# Patient Record
Sex: FEMALE | Race: OTHER
[De-identification: names, ages, dates, MRNs, and addresses within clinical notes are randomized per-mention and may not be internally consistent; named-entity substitution may affect disease eponyms.]

---

## 2020-03-10 ENCOUNTER — HOSPITAL ENCOUNTER (INPATIENT)
Dept: HOSPITAL 72 - SDSOVERFLO | Age: 65
LOS: 4 days | Discharge: HOME | DRG: 328 | End: 2020-03-14
Payer: COMMERCIAL

## 2020-03-10 VITALS — DIASTOLIC BLOOD PRESSURE: 83 MMHG | SYSTOLIC BLOOD PRESSURE: 123 MMHG

## 2020-03-10 VITALS — DIASTOLIC BLOOD PRESSURE: 70 MMHG | SYSTOLIC BLOOD PRESSURE: 111 MMHG

## 2020-03-10 VITALS — SYSTOLIC BLOOD PRESSURE: 127 MMHG | DIASTOLIC BLOOD PRESSURE: 78 MMHG

## 2020-03-10 VITALS — WEIGHT: 183 LBS | BODY MASS INDEX: 31.24 KG/M2 | HEIGHT: 64 IN

## 2020-03-10 VITALS — SYSTOLIC BLOOD PRESSURE: 133 MMHG | DIASTOLIC BLOOD PRESSURE: 87 MMHG

## 2020-03-10 VITALS — DIASTOLIC BLOOD PRESSURE: 69 MMHG | SYSTOLIC BLOOD PRESSURE: 114 MMHG

## 2020-03-10 VITALS — DIASTOLIC BLOOD PRESSURE: 59 MMHG | SYSTOLIC BLOOD PRESSURE: 96 MMHG

## 2020-03-10 VITALS — SYSTOLIC BLOOD PRESSURE: 139 MMHG | DIASTOLIC BLOOD PRESSURE: 90 MMHG

## 2020-03-10 VITALS — DIASTOLIC BLOOD PRESSURE: 59 MMHG | SYSTOLIC BLOOD PRESSURE: 107 MMHG

## 2020-03-10 VITALS — SYSTOLIC BLOOD PRESSURE: 103 MMHG | DIASTOLIC BLOOD PRESSURE: 66 MMHG

## 2020-03-10 VITALS — SYSTOLIC BLOOD PRESSURE: 138 MMHG | DIASTOLIC BLOOD PRESSURE: 93 MMHG

## 2020-03-10 VITALS — DIASTOLIC BLOOD PRESSURE: 55 MMHG | SYSTOLIC BLOOD PRESSURE: 90 MMHG

## 2020-03-10 VITALS — SYSTOLIC BLOOD PRESSURE: 124 MMHG | DIASTOLIC BLOOD PRESSURE: 73 MMHG

## 2020-03-10 VITALS — DIASTOLIC BLOOD PRESSURE: 72 MMHG | SYSTOLIC BLOOD PRESSURE: 120 MMHG

## 2020-03-10 VITALS — DIASTOLIC BLOOD PRESSURE: 85 MMHG | SYSTOLIC BLOOD PRESSURE: 122 MMHG

## 2020-03-10 VITALS — SYSTOLIC BLOOD PRESSURE: 149 MMHG | DIASTOLIC BLOOD PRESSURE: 88 MMHG

## 2020-03-10 VITALS — SYSTOLIC BLOOD PRESSURE: 126 MMHG | DIASTOLIC BLOOD PRESSURE: 83 MMHG

## 2020-03-10 VITALS — SYSTOLIC BLOOD PRESSURE: 109 MMHG | DIASTOLIC BLOOD PRESSURE: 62 MMHG

## 2020-03-10 VITALS — SYSTOLIC BLOOD PRESSURE: 91 MMHG | DIASTOLIC BLOOD PRESSURE: 58 MMHG

## 2020-03-10 VITALS — DIASTOLIC BLOOD PRESSURE: 78 MMHG | SYSTOLIC BLOOD PRESSURE: 121 MMHG

## 2020-03-10 DIAGNOSIS — K42.9: ICD-10-CM

## 2020-03-10 DIAGNOSIS — D64.9: ICD-10-CM

## 2020-03-10 DIAGNOSIS — K21.9: ICD-10-CM

## 2020-03-10 DIAGNOSIS — D72.829: ICD-10-CM

## 2020-03-10 DIAGNOSIS — K44.9: Primary | ICD-10-CM

## 2020-03-10 LAB
ADD MANUAL DIFF: NO
ADD MANUAL DIFF: NO
ANION GAP SERPL CALC-SCNC: 10 MMOL/L (ref 5–15)
ANION GAP SERPL CALC-SCNC: 12 MMOL/L (ref 5–15)
APTT BLD: 28 SEC (ref 23–33)
BASOPHILS NFR BLD AUTO: 0.4 % (ref 0–2)
BASOPHILS NFR BLD AUTO: 1.2 % (ref 0–2)
BUN SERPL-MCNC: 12 MG/DL (ref 7–18)
BUN SERPL-MCNC: 13 MG/DL (ref 7–18)
CALCIUM SERPL-MCNC: 8.8 MG/DL (ref 8.5–10.1)
CALCIUM SERPL-MCNC: 9.1 MG/DL (ref 8.5–10.1)
CHLORIDE SERPL-SCNC: 106 MMOL/L (ref 98–107)
CHLORIDE SERPL-SCNC: 107 MMOL/L (ref 98–107)
CO2 SERPL-SCNC: 24 MMOL/L (ref 21–32)
CO2 SERPL-SCNC: 24 MMOL/L (ref 21–32)
CREAT SERPL-MCNC: 0.7 MG/DL (ref 0.55–1.3)
CREAT SERPL-MCNC: 0.7 MG/DL (ref 0.55–1.3)
EOSINOPHIL NFR BLD AUTO: 0.2 % (ref 0–3)
EOSINOPHIL NFR BLD AUTO: 4.9 % (ref 0–3)
ERYTHROCYTE [DISTWIDTH] IN BLOOD BY AUTOMATED COUNT: 12.8 % (ref 11.6–14.8)
ERYTHROCYTE [DISTWIDTH] IN BLOOD BY AUTOMATED COUNT: 14.4 % (ref 11.6–14.8)
HCT VFR BLD CALC: 40.6 % (ref 37–47)
HCT VFR BLD CALC: 42.9 % (ref 37–47)
HGB BLD-MCNC: 13.1 G/DL (ref 12–16)
HGB BLD-MCNC: 14 G/DL (ref 12–16)
INR PPP: 0.9 (ref 0.9–1.1)
LYMPHOCYTES NFR BLD AUTO: 27.2 % (ref 20–45)
LYMPHOCYTES NFR BLD AUTO: 7.8 % (ref 20–45)
MCV RBC AUTO: 85 FL (ref 80–99)
MCV RBC AUTO: 87 FL (ref 80–99)
MONOCYTES NFR BLD AUTO: 3 % (ref 1–10)
MONOCYTES NFR BLD AUTO: 7.2 % (ref 1–10)
NEUTROPHILS NFR BLD AUTO: 59.5 % (ref 45–75)
NEUTROPHILS NFR BLD AUTO: 88.6 % (ref 45–75)
PHOSPHATE SERPL-MCNC: 4.7 MG/DL (ref 2.5–4.9)
PLATELET # BLD: 274 K/UL (ref 150–450)
PLATELET # BLD: 289 K/UL (ref 150–450)
POTASSIUM SERPL-SCNC: 3.8 MMOL/L (ref 3.5–5.1)
POTASSIUM SERPL-SCNC: 4 MMOL/L (ref 3.5–5.1)
RBC # BLD AUTO: 4.67 M/UL (ref 4.2–5.4)
RBC # BLD AUTO: 5.05 M/UL (ref 4.2–5.4)
SODIUM SERPL-SCNC: 141 MMOL/L (ref 136–145)
SODIUM SERPL-SCNC: 142 MMOL/L (ref 136–145)
WBC # BLD AUTO: 19.4 K/UL (ref 4.8–10.8)
WBC # BLD AUTO: 6.5 K/UL (ref 4.8–10.8)

## 2020-03-10 PROCEDURE — 71045 X-RAY EXAM CHEST 1 VIEW: CPT

## 2020-03-10 PROCEDURE — 85730 THROMBOPLASTIN TIME PARTIAL: CPT

## 2020-03-10 PROCEDURE — 93005 ELECTROCARDIOGRAM TRACING: CPT

## 2020-03-10 PROCEDURE — 0B978ZZ DRAINAGE OF LEFT MAIN BRONCHUS, VIA NATURAL OR ARTIFICIAL OPENING ENDOSCOPIC: ICD-10-PCS

## 2020-03-10 PROCEDURE — 86901 BLOOD TYPING SEROLOGIC RH(D): CPT

## 2020-03-10 PROCEDURE — 94003 VENT MGMT INPAT SUBQ DAY: CPT

## 2020-03-10 PROCEDURE — 0BUT4JZ SUPPLEMENT DIAPHRAGM WITH SYNTHETIC SUBSTITUTE, PERCUTANEOUS ENDOSCOPIC APPROACH: ICD-10-PCS

## 2020-03-10 PROCEDURE — 74220 X-RAY XM ESOPHAGUS 1CNTRST: CPT

## 2020-03-10 PROCEDURE — 86920 COMPATIBILITY TEST SPIN: CPT

## 2020-03-10 PROCEDURE — 80048 BASIC METABOLIC PNL TOTAL CA: CPT

## 2020-03-10 PROCEDURE — 85025 COMPLETE CBC W/AUTO DIFF WBC: CPT

## 2020-03-10 PROCEDURE — 82330 ASSAY OF CALCIUM: CPT

## 2020-03-10 PROCEDURE — 87081 CULTURE SCREEN ONLY: CPT

## 2020-03-10 PROCEDURE — 85610 PROTHROMBIN TIME: CPT

## 2020-03-10 PROCEDURE — 0WQF4ZZ REPAIR ABDOMINAL WALL, PERCUTANEOUS ENDOSCOPIC APPROACH: ICD-10-PCS

## 2020-03-10 PROCEDURE — 85007 BL SMEAR W/DIFF WBC COUNT: CPT

## 2020-03-10 PROCEDURE — 83735 ASSAY OF MAGNESIUM: CPT

## 2020-03-10 PROCEDURE — 86900 BLOOD TYPING SEROLOGIC ABO: CPT

## 2020-03-10 PROCEDURE — 94150 VITAL CAPACITY TEST: CPT

## 2020-03-10 PROCEDURE — 36415 COLL VENOUS BLD VENIPUNCTURE: CPT

## 2020-03-10 PROCEDURE — 86850 RBC ANTIBODY SCREEN: CPT

## 2020-03-10 PROCEDURE — 0DV44ZZ RESTRICTION OF ESOPHAGOGASTRIC JUNCTION, PERCUTANEOUS ENDOSCOPIC APPROACH: ICD-10-PCS

## 2020-03-10 PROCEDURE — 0DS64ZZ REPOSITION STOMACH, PERCUTANEOUS ENDOSCOPIC APPROACH: ICD-10-PCS

## 2020-03-10 PROCEDURE — 84100 ASSAY OF PHOSPHORUS: CPT

## 2020-03-10 RX ADMIN — SODIUM CHLORIDE SCH MLS/HR: 0.9 INJECTION INTRAVENOUS at 22:02

## 2020-03-10 RX ADMIN — DEXTROSE, SODIUM CHLORIDE, AND POTASSIUM CHLORIDE SCH MLS/HR: 5; .45; .15 INJECTION INTRAVENOUS at 20:31

## 2020-03-10 NOTE — IMMEDIATE POST-OP EVALUATION
Immediate Post-Op Evalulation


Immediate Post-Op Evalulation


Procedure:  Laparoscopic paraesophageal hernia repair, nissin fundoplication


Date of Evaluation:  Mar 10, 2020


Time of Evaluation:  16:36


IV Fluids:  1.4L


Blood Products:  0


Estimated Blood Loss:  50


Urinary Output:  300


Blood Pressure Systolic:  96


Blood Pressure Diastolic:  59


Pulse Rate:  92


Respiratory Rate:  18


O2 Sat by Pulse Oximetry:  100


Temperature (Fahrenheit):  97


Pain Score (1-10):  0


Nausea:  No


Vomiting:  No


Complications


0


Patient Status:  awake, reacts, patent, none


Hydration Status:  adequate


Drug:  Ancef 2g


Given Within 1 Hr of Incision:  Yes











Chana Banegas MD Mar 10, 2020 16:36

## 2020-03-10 NOTE — HISTORY AND PHYSICAL REPORT
DATE OF ADMISSION:  03/10/2020

ADMITTING DOCTOR:  Antoni Arnold M.D.



HISTORY OF PRESENT ILLNESS:  The patient is a 65-year-old  female

who presented to [facility] for elective repair of a

paraesophageal hernia.  She denies any nausea, vomiting, fever, or

chills.



PAST MEDICAL HISTORY:  Notable for gastroesophageal reflux.



PAST SURGICAL HISTORY:  Notable for tubal ligation in .



MEDICATIONS:  Include omeprazole.



ALLERGIES:  The patient is allergic to Advil.



FAMILY HISTORY:  Father  from unknown cause.  Mother is healthy.

She is  and lives with partner in [place], [place].

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



SOCIAL HISTORY:  The patient denies any alcohol, tobacco, or illicit drug

use.



PHYSICAL EXAMINATION:

VITAL SIGNS:  She is noted to be afebrile.  Her vitals are within normal

limits.

CARDIAC:  Regular rate and rhythm.  No gallops or murmur.

RESPIRATORY:  Clear to auscultation bilaterally.

ABDOMEN:  Soft, nondistended, nontender with normoactive bowel sounds.

EXTREMITIES:  No evidence of cyanosis, clubbing, or edema.



LABORATORY DATA:  Laboratory studies performed on 03/10/2020 showed WBC of

6.5, hemoglobin 14, hematocrit 43, and platelet count of 289.  Sodium is

142, potassium 3.8, chloride 106, bicarb is 24, BUN 13, creatinine 0.7,

and glucose is 90.  Her PT is 9.9, PTT is 28, INR is 0.9.



ASSESSMENT AND PLAN:  This is a 65-year-old  female who presented

to [facility] with elective repair of a paraesophageal hernia.

The patient was evaluated at bedside.  We will proceed with a

laparoscopic repair of paraesophageal hernia with fundoplication.  The

risks and benefits of the proposed operation were explained to the patient

in detail.  She understands and wishes to proceed without reservation.









  ______________________________________________

  Judd M.D.





DR:  TETE

D:  03/10/2020 11:43

T:  03/10/2020 18:29

JOB#:  [id number]

CC:

## 2020-03-10 NOTE — BRIEF OPERATIVE NOTE
Immediate Post Operative Note


Operative Note


Pre-op Diagnosis:


Paraesophageal hernia


Procedure:


Bronchoscopy, laparoscopic paraesophageal hernia repair with Phasix 

biodegradable mesh & Tong fundoplication.


Post-op Diagnosis:


same as preop


Post-op Diagnosis:  same as pre-op


Surgeon:  Rajendra arnold


Assistant:  MATT Barrera


Anesthesia:  general


Specimen:  none


Complications:  none


Condition:  stable


Fluids:  crystalloids


Estimated Blood Loss:  minimal


Drains:  none


Implant(s) used?:  Yes











Rajendra Arnold MD Mar 10, 2020 16:47

## 2020-03-10 NOTE — ANETHESIA PREOPERATIVE EVAL
Anesthesia Pre-op PMH/ROS


General


Date of Evaluation:  Mar 10, 2020


Anesthesiologist:  [place]


ASA Score:  ASA 2


Mallampati Score


Class I : Soft palate, uvula, fauces, pillars visible


Class II: Soft palate, uvula, fauces visible


Class III: Soft palate, base of uvula visible


Class IV: Only hard plate visible


Mallampati Classification:  Class II


Surgeon:  Clayton


Diagnosis:  PEH


Surgical Procedure:  laparoscopic peh repair, with nissen fundoplication, 

possible laparotomy


Anesthesia History:  none


Family History:  no anesthesia problems


Allergies:  


Coded Allergies:  


     IBUPROFEN (Verified  Allergy, Severe, 3/10/20)


 swelling in the mouth


Medications:  see eMAR


Patient NPO?:  Yes


NPO Date:  Mar 9, 2020


NPO Time:  2000





Past Medical History


Cardiovascular:  Denies: HTN, CAD, MI, valve dz, arrhythmia, other


Pulmonary:  Denies: asthma, COPD, JASON, other


Gastrointestinal/Genitourinary:  Reports: GERD; 


   Denies: CRI, ESRD, other


Neurologic/Psychiatric:  Denies: dementia, CVA, depression/anxiety, TIA, other


Endocrine:  Denies: DM, hypothyroidism, steroids, other


HEENT:  Denies: cataract (L), cataract (R), glaucoma, [place] (L), [place] (R), other


Hematology/Immune:  Denies: anemia, DVT, bleeding disorder, other


Musculoskeletal/Integumentary:  Denies: OA, RA, DJD, DDD, edema, other


PSxH Narrative:


BTL, bilateral cataract





Anesthesia Pre-op Phys. Exam


Physician Exam





Last Vital Signs








  Date Time  Temp Pulse Resp B/P (MAP) Pulse Ox O2 Delivery O2 Flow Rate FiO2


 


3/10/20 09:59      Room Air  


 


3/10/20 09:22 97.0 78 18 127/78 (94) 100   








Constitutional:  NAD


Cardiovascular:  RRR


Respiratory:  CTA





Airway Exam


Mallampati Score:  Class II


MO:  full


ROM:  full





Anesthesia Pre-op A/P


Labs





Hematology








Test


  3/10/20


09:30


 


White Blood Count


  6.5 K/UL


(4.8-10.8)


 


Red Blood Count


  5.05 M/UL


(4.20-5.40)


 


Hemoglobin


  14.0 G/DL


(12.0-16.0)


 


Hematocrit


  42.9 %


(37.0-47.0)


 


Mean Corpuscular Volume 85 FL (80-99)  


 


Mean Corpuscular Hemoglobin


  27.7 PG


(27.0-31.0)


 


Mean Corpuscular Hemoglobin


Concent 32.7 G/DL


(32.0-36.0)


 


Red Cell Distribution Width


  12.8 %


(11.6-14.8)


 


Platelet Count


  289 K/UL


(150-450)


 


Mean Platelet Volume


  5.5 FL


(6.5-10.1)  L


 


Neutrophils (%) (Auto)


  59.5 %


(45.0-75.0)


 


Lymphocytes (%) (Auto)


  27.2 %


(20.0-45.0)


 


Monocytes (%) (Auto)


  7.2 %


(1.0-10.0)


 


Eosinophils (%) (Auto)


  4.9 %


(0.0-3.0)  H


 


Basophils (%) (Auto)


  1.2 %


(0.0-2.0)








Coagulation








Test


  3/10/20


09:30


 


Prothrombin Time


  9.9 SEC


(9.30-11.50)


 


Prothromb Time International


Ratio 0.9 (0.9-1.1)  


 


 


Activated Partial


Thromboplast Time 28 SEC (23-33)


 











Studies


Pre-op Studies:  EKG - sr





Risk Assessment & Plan


Assessment:


ASA II


Plan:


GA with NICK


Status Change Before Surgery:  No





Pre-Antibiotics


Drug:  Chana Llamas MD Mar 10, 2020 10:33

## 2020-03-10 NOTE — PRE-PROCEDURE NOTE/ATTESTATION
Pre-Procedure Note/Attestation


Complete Prior to Procedure


Planned Procedure:  not applicable


Procedure Narrative:


64 yo  female with paraesophageal hernia presented for elective repari





Indications for Procedure


Pre-Operative Diagnosis:


Paraesophageal hernia





Attestation


I attest that I discussed the nature of the procedure; its benefits; risks and 

complications; and alternatives (and the risks and benefits of such alternatives

), prior to the procedure, with the patient (or the patient's legal 

representative).





I attest that, if there was a reasonable possibility of needing a blood 

transfusion, the patient (or the patient's legal representative) was given the 

[facility] standardized written summary, pursuant 

to the Markell Maria R Blood Safety Act ([place] Health and Safety Code # 1645, as 

amended).





I attest that I re-evaluated the patient just prior to the surgery and that 

there has been no change in the patient's H&P, except as documented below:











Rajendra Arnold MD Mar 10, 2020 11:43

## 2020-03-10 NOTE — OPERATIVE NOTE - DICTATED
DATE OF OPERATION:  03/10/2020



SURGEON:  Rajendra Arnold M.D.



ASSISTANT SURGEON:  Enrrique Barrera M.D.



PREOPERATIVE DIAGNOSIS:  Paraesophageal hernia.



POSTOPERATIVE DIAGNOSIS:  Paraesophageal hernia.



PROCEDURE PERFORMED:

1. Flexible bronchoscopy.

2. Laparoscopic repair of paraesophageal hernia with Phasix

biodegradable mesh.

3.  Lysis of adhesions.

4. Tong fundoplication/gastropexy.

5. Repair of umbilical hernia.



ANESTHESIA:  Double-lumen general anesthesia.



INDICATIONS FOR PROCEDURE:  The patient is a 65-year-old  female

who presented with a chronic history of gastroesophageal reflux associated

with paraesophageal hernia to the [facility] for surgical

intervention.  The risk and benefit of the proposed operation was

explained to the patient including, but not limited to bleeding,

infection, pneumothorax, esophageal perforation, reherniation, the need

for blood transfusion, and anesthetic complication.  In addition,

alternative versus no treatment options were also discussed.  The patient

fully understands the rationale behind the proposed operation.  All

questions answered to her satisfaction.



DESCRIPTION OF PROCEDURE:  After obtaining the informed consent, the

patient was then brought to the operating room, placed in the supine

position, and a surgical time-out was performed.  After induction of

general anesthesia, an indwelling arterial line as well as SOTO hose

stockings were placed.  The patient was then replaced in lithotomy position.



The bronchoscope was introduced through the endotracheal tube.  The

trachea and helene were inspected.  The helene was midline sharp.  The

right tracheobronchial tree down to the subsegmental level was grossly

normal and no endobronchial lesions were seen.  Similarly, the left

mainstem bronchus was intubated and no obvious endobronchial lesions were

visualized at the subsegmental level.  A minimal amount of mucopurulent

secretion was lavaged and suctioned clear.  The bronchoscope was pulled

back and removed.  The patient tolerated the procedure well with oxygen

saturation greater than 96% throughout the procedure.



Next, the patient was then placed in the lithotomy position, prepped and

draped in the usual sterile fashion.  The patient also received

preoperative intravenous antibiotic.  A supraumbilical incision was then

made.  Dissection was then carried down into the subcutaneous tissue.  The

umbilical defect was appreciated.  After placement of a trocar via the hernia 
and

CO2 insufflation, we then proceeded to place 3 additional ports in the

right subcostal as well as in a paramedian position.  A liver retractor was

then placed via the right costal incision and the liver was then retracted

out of the way.  After placement of a videoscope into the

intra-abdominal cavity, we then proceeded to place 2 graspers into the

abdominal cavity.  There were noted to be some adhesions and these were

taken down using a combination of sharp and blunt electrocauterization.

Initial inspection of the stomach demonstrated that p;roximal half of the 
stomach was herniating into the

left hemithorax.  The stomach was then slowly reduced into the

intra-abdominal cavity without any difficulty.  We then proceeded to

identify the greater omentum along the greater curvature of the stomach.

Using a Harmonic device, the greater omentum was then divided from the

gastric reservoir.  This division of the gastric omentum was carried all

the way up to the level of the left kirit.  During this process, the short

gastric vessels were divided.  At this point, the distal esophagus was

then dissected circumferentially.  There was at least 3 cm of

intra-abdominal esophagus visualized in the abdominal cavity.  At this

point, we then entered the lesser sac and freed the superior border of the

lesser curvature in preparation for fundoplication.  Both the left and

right crura were then isolated and defined.  A nasogastric tube was then

placed into the gastric reservoir.  There was a large esophageal hiatal

defect seen.  This hiatal defect was then closed primarily using a 2-0

Ethibond in a figure-of-eight configuration anteriorly.  A biodegradable Phasix 
mesh

was then fashioned and then placed just inferior to the diaphragm around

the distal esophagus; the mesh was then anchored to the adjacent diaphragm

and secured using protacks.



At this point, we then proceeded to perform a Tong fundoplication.  The

fundus of the stomach was then freed and brought anteriorly up to the

lesser curvature.  The Tong fundoplication/gastropexy was then fashioned

using interrupted 2-0 Prolene.  At the end of the procedure, the entire

intra-abdominal cavity was then irrigated with 2 liters of warm saline.

After suctioning, hemostasis was then assured.  The CO2 was then

evacuated and the trocar was then removed.  The umbilical hernia was 
identified.  

This was closed using 0 Ethibond in a figure-of-eight configuration. The wound 
was then

reapproximated with 3-0 Vicryl suture and the skin was reapproximated with

4-0 Monocryl.  Steri-Strips and a dry dressing was applied.  The patient

tolerated the procedure well without any complications.  Needle and

sponges were correct at the end of the operation. She was then extubated

and transported to the recovery room in a satisfactory condition.



EBL:  Minimal.



COMPLICATIONS:  None.









  ______________________________________________

  Judd M.D.





DR:  TETE

D:  03/10/2020 17:01

T:  03/10/2020 21:18

JOB#:  [id number]

CC:



KOBE

## 2020-03-11 VITALS — DIASTOLIC BLOOD PRESSURE: 61 MMHG | SYSTOLIC BLOOD PRESSURE: 119 MMHG

## 2020-03-11 VITALS — SYSTOLIC BLOOD PRESSURE: 122 MMHG | DIASTOLIC BLOOD PRESSURE: 70 MMHG

## 2020-03-11 VITALS — DIASTOLIC BLOOD PRESSURE: 68 MMHG | SYSTOLIC BLOOD PRESSURE: 122 MMHG

## 2020-03-11 VITALS — SYSTOLIC BLOOD PRESSURE: 103 MMHG | DIASTOLIC BLOOD PRESSURE: 53 MMHG

## 2020-03-11 VITALS — SYSTOLIC BLOOD PRESSURE: 102 MMHG | DIASTOLIC BLOOD PRESSURE: 56 MMHG

## 2020-03-11 VITALS — SYSTOLIC BLOOD PRESSURE: 92 MMHG | DIASTOLIC BLOOD PRESSURE: 53 MMHG

## 2020-03-11 VITALS — SYSTOLIC BLOOD PRESSURE: 110 MMHG | DIASTOLIC BLOOD PRESSURE: 64 MMHG

## 2020-03-11 VITALS — SYSTOLIC BLOOD PRESSURE: 112 MMHG | DIASTOLIC BLOOD PRESSURE: 68 MMHG

## 2020-03-11 VITALS — SYSTOLIC BLOOD PRESSURE: 131 MMHG | DIASTOLIC BLOOD PRESSURE: 63 MMHG

## 2020-03-11 VITALS — DIASTOLIC BLOOD PRESSURE: 71 MMHG | SYSTOLIC BLOOD PRESSURE: 114 MMHG

## 2020-03-11 VITALS — DIASTOLIC BLOOD PRESSURE: 54 MMHG | SYSTOLIC BLOOD PRESSURE: 104 MMHG

## 2020-03-11 VITALS — SYSTOLIC BLOOD PRESSURE: 105 MMHG | DIASTOLIC BLOOD PRESSURE: 65 MMHG

## 2020-03-11 VITALS — DIASTOLIC BLOOD PRESSURE: 75 MMHG | SYSTOLIC BLOOD PRESSURE: 134 MMHG

## 2020-03-11 VITALS — SYSTOLIC BLOOD PRESSURE: 99 MMHG | DIASTOLIC BLOOD PRESSURE: 55 MMHG

## 2020-03-11 VITALS — SYSTOLIC BLOOD PRESSURE: 105 MMHG | DIASTOLIC BLOOD PRESSURE: 54 MMHG

## 2020-03-11 VITALS — SYSTOLIC BLOOD PRESSURE: 109 MMHG | DIASTOLIC BLOOD PRESSURE: 60 MMHG

## 2020-03-11 VITALS — SYSTOLIC BLOOD PRESSURE: 123 MMHG | DIASTOLIC BLOOD PRESSURE: 69 MMHG

## 2020-03-11 VITALS — SYSTOLIC BLOOD PRESSURE: 116 MMHG | DIASTOLIC BLOOD PRESSURE: 66 MMHG

## 2020-03-11 VITALS — DIASTOLIC BLOOD PRESSURE: 77 MMHG | SYSTOLIC BLOOD PRESSURE: 126 MMHG

## 2020-03-11 VITALS — DIASTOLIC BLOOD PRESSURE: 57 MMHG | SYSTOLIC BLOOD PRESSURE: 119 MMHG

## 2020-03-11 VITALS — SYSTOLIC BLOOD PRESSURE: 126 MMHG | DIASTOLIC BLOOD PRESSURE: 72 MMHG

## 2020-03-11 VITALS — DIASTOLIC BLOOD PRESSURE: 69 MMHG | SYSTOLIC BLOOD PRESSURE: 127 MMHG

## 2020-03-11 VITALS — SYSTOLIC BLOOD PRESSURE: 124 MMHG | DIASTOLIC BLOOD PRESSURE: 64 MMHG

## 2020-03-11 VITALS — DIASTOLIC BLOOD PRESSURE: 62 MMHG | SYSTOLIC BLOOD PRESSURE: 117 MMHG

## 2020-03-11 VITALS — DIASTOLIC BLOOD PRESSURE: 52 MMHG | SYSTOLIC BLOOD PRESSURE: 98 MMHG

## 2020-03-11 VITALS — DIASTOLIC BLOOD PRESSURE: 72 MMHG | SYSTOLIC BLOOD PRESSURE: 133 MMHG

## 2020-03-11 LAB
ADD MANUAL DIFF: YES
ANION GAP SERPL CALC-SCNC: 11 MMOL/L (ref 5–15)
BUN SERPL-MCNC: 5 MG/DL (ref 7–18)
CALCIUM SERPL-MCNC: 8.5 MG/DL (ref 8.5–10.1)
CHLORIDE SERPL-SCNC: 108 MMOL/L (ref 98–107)
CO2 SERPL-SCNC: 24 MMOL/L (ref 21–32)
CREAT SERPL-MCNC: 0.6 MG/DL (ref 0.55–1.3)
ERYTHROCYTE [DISTWIDTH] IN BLOOD BY AUTOMATED COUNT: 12.5 % (ref 11.6–14.8)
HCT VFR BLD CALC: 35.1 % (ref 37–47)
HGB BLD-MCNC: 12 G/DL (ref 12–16)
MCV RBC AUTO: 84 FL (ref 80–99)
PHOSPHATE SERPL-MCNC: 2.8 MG/DL (ref 2.5–4.9)
PLATELET # BLD: 240 K/UL (ref 150–450)
POTASSIUM SERPL-SCNC: 4 MMOL/L (ref 3.5–5.1)
RBC # BLD AUTO: 4.17 M/UL (ref 4.2–5.4)
SODIUM SERPL-SCNC: 143 MMOL/L (ref 136–145)
WBC # BLD AUTO: 14.3 K/UL (ref 4.8–10.8)

## 2020-03-11 RX ADMIN — DEXTROSE, SODIUM CHLORIDE, AND POTASSIUM CHLORIDE SCH MLS/HR: 5; .45; .15 INJECTION INTRAVENOUS at 04:32

## 2020-03-11 RX ADMIN — PANTOPRAZOLE SODIUM SCH MG: 40 INJECTION, POWDER, FOR SOLUTION INTRAVENOUS at 08:31

## 2020-03-11 RX ADMIN — DEXTROSE, SODIUM CHLORIDE, AND POTASSIUM CHLORIDE SCH MLS/HR: 5; .45; .15 INJECTION INTRAVENOUS at 12:30

## 2020-03-11 RX ADMIN — SODIUM CHLORIDE SCH MLS/HR: 0.9 INJECTION INTRAVENOUS at 05:36

## 2020-03-11 NOTE — 48 HOUR POST ANESTHESIA EVAL
Post Anesthesia Evaluation


Procedure:  Laparoscopic paraesophageal hernia repair, nissin fundoplication


Date of Evaluation:  Mar 11, 2020


Time of Evaluation:  07:37


Blood Pressure Systolic:  103


0:  53


Pulse Rate:  90


Respiratory Rate:  15


Temperature (Fahrenheit):  99.5


O2 Sat by Pulse Oximetry:  100


Airway:  patent


Nausea:  No


Vomiting:  No


Pain Intensity:  3


Hydration Status:  adequate


Cardiopulmonary Status:


Stable


Mental Status/LOC:  patient returned to baseline


Follow-up Care/Observations:


0


Post-Anesthesia Complications:


0


Follow-up care needed:  N/A











Alfred Blankenship MD Mar 11, 2020 07:38

## 2020-03-11 NOTE — DIAGNOSTIC IMAGING REPORT
INDICATION:  Status post gastroesophageal surgery, pain, evaluation for leak

 

TECHNIQUE: Patient ingested water-soluble contrast into the calvarium, and direct

fluoroscopic visualization was performed.

 

Fluoroscopy time:  221econds

Total dose: 4. mGym2

Total number of images: 21

 

COMPARISON: None

 

FINDINGS: Distal esophagus is somewhat dilated, but there is brisk transit of

contrast through the distal esophagus into the stomach. Under fluoroscopy, no

gastroesophageal reflux was observed. No evidence of leakage demonstrated. There is a

transit of contrast into the small bowel.

 

IMPRESSION: No evidence of leakage or obstruction demonstrated

 

Findings discussed by phone previously with Dr. Arnold

## 2020-03-11 NOTE — CONSULTATION
DATE OF CONSULTATION:  03/11/2020

INTERNAL MEDICINE/PULMONARY CONSULTATION



CONSULTING PHYSICIAN:  Boris Amador M.D.



HISTORY OF PRESENT ILLNESS:  This is a 65-year-old female who came to the

hospital for repair of a paraesophageal hernia.  This was done yesterday

by Dr. Rajendra Arnold and Dr. Barrera.  The surgery consisted of bronchoscopy,

laparoscopic repair of paraesophageal hernia with biodegradable mesh,

fundoplication and gastropexy as well as repair of umbilical hernia.  This

was done under double-lumen general anesthesia.  The patient has recovered

satisfactorily, has been extubated, and is doing well in the ICU

currently.



PAST MEDICAL HISTORY:  The patient has a past history of ibuprofen allergy

only.  She has no other significant known medical problems.



PREVIOUS SURGERIES:  Tubal ligation.



HOME MEDICATIONS:  Omeprazole.



FAMILY HISTORY:  None.



SOCIAL HISTORY:  No history of tobacco use.



PHYSICAL EXAMINATION:

GENERAL:  Reveals a 65-year-old female.

HEENT:  Unremarkable.

LUNGS:  Clear breath sounds bilaterally.

HEART:  Normal heart sounds.

ABDOMEN:  Soft.

EXTREMITIES:  There is no edema.

NEUROLOGIC:  Nonfocal.



LABORATORY DATA:  Lab testing is unremarkable with normal CBC and BMP with

the exception being white count today of 14.3.  Hemoglobin is normal at

12.  Coags are normal.  X-ray chest preoperatively is unremarkable.



IMPRESSION:

1. Postoperative day #1 status post repair of paraesophageal hernia,

gastropexy and bronchoscopy.

2. Mild leukocytosis.

3. Borderline anemia.

4. Gastroesophageal reflux disease.



DISCUSSION:  The patient is scheduled for esophagram today.  If passes, we

will start diet and discharge out of ICU to medical/surgical floor.  We

will follow as internist and pulmonologist.  Discussed with Dr. Rajendra Arnold.









  ______________________________________________

  Boris Amador M.D. DR:  BENI

D:  03/11/2020 10:07

T:  03/11/2020 17:13

JOB#:  [id number]

CC:

## 2020-03-11 NOTE — DIAGNOSTIC IMAGING REPORT
Indication: Dyspnea

 

Technique: One view of the chest

 

Comparison: None

 

Findings: There is a nasogastric tube in place, tip of which projects at the level of

the gastric fundus body junction, in good position. There is pleural fluid on the

left. There is some atelectasis at the left lung base. There is also consolidation in

the retrocardiac region There is a tiny left apical pneumothorax. There is

subcutaneous emphysema within the left chest wall, and bilateral supraclavicular

fossae. The right lung and pleural space are clear. The heart size is normal. There

are severe degenerative changes of the left shoulder.

 

Impression: Tiny left pneumothorax and subcutaneous emphysema. Patient is status post

laparoscopic paraesophageal hernia repair so these are presumably expected findings

 

Satisfactory nasogastric intubation

 

Left basilar atelectasis, retrocardiac consolidation, and small pleural effusion

## 2020-03-12 VITALS — SYSTOLIC BLOOD PRESSURE: 111 MMHG | DIASTOLIC BLOOD PRESSURE: 61 MMHG

## 2020-03-12 VITALS — DIASTOLIC BLOOD PRESSURE: 71 MMHG | SYSTOLIC BLOOD PRESSURE: 119 MMHG

## 2020-03-12 VITALS — DIASTOLIC BLOOD PRESSURE: 75 MMHG | SYSTOLIC BLOOD PRESSURE: 130 MMHG

## 2020-03-12 VITALS — SYSTOLIC BLOOD PRESSURE: 111 MMHG | DIASTOLIC BLOOD PRESSURE: 68 MMHG

## 2020-03-12 VITALS — DIASTOLIC BLOOD PRESSURE: 68 MMHG | SYSTOLIC BLOOD PRESSURE: 121 MMHG

## 2020-03-12 VITALS — DIASTOLIC BLOOD PRESSURE: 57 MMHG | SYSTOLIC BLOOD PRESSURE: 97 MMHG

## 2020-03-12 VITALS — DIASTOLIC BLOOD PRESSURE: 76 MMHG | SYSTOLIC BLOOD PRESSURE: 120 MMHG

## 2020-03-12 VITALS — SYSTOLIC BLOOD PRESSURE: 111 MMHG | DIASTOLIC BLOOD PRESSURE: 58 MMHG

## 2020-03-12 VITALS — DIASTOLIC BLOOD PRESSURE: 70 MMHG | SYSTOLIC BLOOD PRESSURE: 128 MMHG

## 2020-03-12 VITALS — DIASTOLIC BLOOD PRESSURE: 66 MMHG | SYSTOLIC BLOOD PRESSURE: 120 MMHG

## 2020-03-12 VITALS — DIASTOLIC BLOOD PRESSURE: 83 MMHG | SYSTOLIC BLOOD PRESSURE: 130 MMHG

## 2020-03-12 VITALS — SYSTOLIC BLOOD PRESSURE: 129 MMHG | DIASTOLIC BLOOD PRESSURE: 80 MMHG

## 2020-03-12 VITALS — SYSTOLIC BLOOD PRESSURE: 116 MMHG | DIASTOLIC BLOOD PRESSURE: 66 MMHG

## 2020-03-12 VITALS — SYSTOLIC BLOOD PRESSURE: 114 MMHG | DIASTOLIC BLOOD PRESSURE: 54 MMHG

## 2020-03-12 LAB
ADD MANUAL DIFF: NO
ANION GAP SERPL CALC-SCNC: 7 MMOL/L (ref 5–15)
BASOPHILS NFR BLD AUTO: 1.2 % (ref 0–2)
BUN SERPL-MCNC: 11 MG/DL (ref 7–18)
CALCIUM SERPL-MCNC: 8.4 MG/DL (ref 8.5–10.1)
CHLORIDE SERPL-SCNC: 107 MMOL/L (ref 98–107)
CO2 SERPL-SCNC: 29 MMOL/L (ref 21–32)
CREAT SERPL-MCNC: 0.6 MG/DL (ref 0.55–1.3)
EOSINOPHIL NFR BLD AUTO: 0.3 % (ref 0–3)
ERYTHROCYTE [DISTWIDTH] IN BLOOD BY AUTOMATED COUNT: 12.8 % (ref 11.6–14.8)
HCT VFR BLD CALC: 35.8 % (ref 37–47)
HGB BLD-MCNC: 12.1 G/DL (ref 12–16)
LYMPHOCYTES NFR BLD AUTO: 11.8 % (ref 20–45)
MCV RBC AUTO: 85 FL (ref 80–99)
MONOCYTES NFR BLD AUTO: 5.3 % (ref 1–10)
NEUTROPHILS NFR BLD AUTO: 81.4 % (ref 45–75)
PLATELET # BLD: 219 K/UL (ref 150–450)
POTASSIUM SERPL-SCNC: 3.8 MMOL/L (ref 3.5–5.1)
RBC # BLD AUTO: 4.23 M/UL (ref 4.2–5.4)
SODIUM SERPL-SCNC: 142 MMOL/L (ref 136–145)
WBC # BLD AUTO: 10.7 K/UL (ref 4.8–10.8)

## 2020-03-12 RX ADMIN — PANTOPRAZOLE SODIUM SCH MG: 40 INJECTION, POWDER, FOR SOLUTION INTRAVENOUS at 08:30

## 2020-03-12 NOTE — PULMONOLOGY PROGRESS NOTE
Assessment/Plan


Assessment/Plan


IMPRESSION:


1. Postoperative day #2 status post repair of paraesophageal hernia,


gastropexy and bronchoscopy.


2. Mild leukocytosis.


3. Borderline anemia.


4. Gastroesophageal reflux disease.





DISCUSSION:  OK to transfer out of ICU to medical/surgical floor.  


I will follow as internist and pulmonologist.  


Discussed with Dr. Rajendra Arnold.


Advance diet per surgery














  ______________________________________________


  Boris Amador M.D.





Subjective


Interval Events:  Feeling better; on diet


Constitutional:  Reports: no symptoms


HEENT:  Repors: no symptoms


Respiratory:  Reports: no symptoms


Cardiovascular:  Reports: no symptoms


Gastrointestinal/Abdominal:  Reports: no symptoms


Allergies:  


Coded Allergies:  


     IBUPROFEN (Verified  Allergy, Severe, 3/10/20)


 swelling in the mouth





Objective





Last 24 Hour Vital Signs








  Date Time  Temp Pulse Resp B/P (MAP) Pulse Ox O2 Delivery O2 Flow Rate FiO2


 


3/12/20 10:00  93 25 128/70 (89) 94   


 


3/12/20 09:52  87      


 


3/12/20 09:00  95 25 114/54 (74) 98   


 


3/12/20 08:00      Nasal Cannula 2.0 


 


3/12/20 08:00 98.5 95 24 130/83 (99) 100   


 


3/12/20 07:00  85 14 111/68 (82) 99   


 


3/12/20 06:00  97 18 116/66 (83) 99   


 


3/12/20 05:00  87 27 111/61 (78) 99   


 


3/12/20 04:00      Nasal Cannula 2.0 


 


3/12/20 04:00 98.7 91 33 120/66 (84) 100   


 


3/12/20 03:14  90      


 


3/12/20 03:00  87 24 119/71 (87) 97   


 


3/12/20 02:00  92 25 97/57 (70) 99   


 


3/12/20 01:00  90 19 121/68 (85) 99   


 


3/12/20 00:00 98.7 90 15 111/58 (75) 97   


 


3/12/20 00:00      Nasal Cannula 2.0 


 


3/11/20 23:27  96      


 


3/11/20 23:00  96 21 104/54 (71) 99   


 


3/11/20 22:00  96 25 98/52 (67) 100   


 


3/11/20 21:00  92 20 102/56 (71) 99   


 


3/11/20 20:00      Nasal Cannula 2.0 


 


3/11/20 20:00 98.6 99 27 105/54 (71) 100   


 


3/11/20 19:29  83      


 


3/11/20 19:00  91 17 124/64 (84) 98   


 


3/11/20 18:00  90 20 126/72 (90) 95   


 


3/11/20 17:00  92 30 134/75 (94) 100   


 


3/11/20 16:11 98.2 96 23 133/72 (92) 100   


 


3/11/20 16:00  81      


 


3/11/20 16:00      Nasal Cannula 2.0 


 


3/11/20 14:00  90 22 127/69 (88) 100   


 


3/11/20 13:00  84 39 131/63 (85) 100   


 


3/11/20 12:00 98.4 85 30 122/70 (87) 100   


 


3/11/20 12:00 98.4       


 


3/11/20 12:00  84      


 


3/11/20 12:00      Nasal Cannula 2.0 


 


3/11/20 11:00  94 31 123/69 (87) 100   

















Intake and Output  


 


 3/11/20 3/12/20





 19:00 07:00


 


Intake Total 987.5 ml 350 ml


 


Output Total 895 ml 600 ml


 


Balance 92.5 ml -250 ml


 


  


 


Intake Oral 50 ml 350 ml


 


IV Total 937.5 ml 


 


Output Urine Total 895 ml 600 ml


 


# Voids  2


 


# Bowel Movements  2








General Appearance:  no acute distress


HEENT:  normocephalic


Respiratory/Chest:  chest wall non-tender


Cardiovascular:  normal peripheral pulses


Abdomen:  normal bowel sounds





Microbiology








 Date/Time


Source Procedure


Growth Status


 


 


 3/10/20 10:03


Nasal Nares MRSA Culture - Final


NO METHICILLIN RESISTANT STAPH AUREUS... Complete








Laboratory Tests


3/12/20 03:35: 


White Blood Count 10.7, Red Blood Count 4.23, Hemoglobin 12.1, Hematocrit 35.8L

, Mean Corpuscular Volume 85, Mean Corpuscular Hemoglobin 28.5, Mean 

Corpuscular Hemoglobin Concent 33.7, Red Cell Distribution Width 12.8, Platelet 

Count 219, Mean Platelet Volume 5.9L, Neutrophils (%) (Auto) 81.4H, Lymphocytes 

(%) (Auto) 11.8L, Monocytes (%) (Auto) 5.3, Eosinophils (%) (Auto) 0.3, 

Basophils (%) (Auto) 1.2, Sodium Level 142, Potassium Level 3.8, Chloride Level 

107, Carbon Dioxide Level 29, Anion Gap 7, Blood Urea Nitrogen 11, Creatinine 

0.6, Estimat Glomerular Filtration Rate > 60, Glucose Level 112H, Calcium Level 

8.4L





Current Medications








 Medications


  (Trade)  Dose


 Ordered  Sig/Kassy


 Route


 PRN Reason  Start Time


 Stop Time Status Last Admin


Dose Admin


 


 Acetaminophen/


 Hydrocodone Bitart


  (Norco 10/325)  1 tab  Q4H  PRN


 ORAL


 Moderate Pain (Pain Scale 4-6)  3/12/20 11:00


 3/18/20 10:59   


 


 


 Acetaminophen/


 Hydrocodone Bitart


  (Norco 5/325)  1 tab  Q4H  PRN


 ORAL


 Mild Pain (Pain Scale 1-3)  3/12/20 11:00


 3/18/20 10:59   


 


 


 Barium Sulfate


  (Barium EZ Gas


 II)  1 ea  NOW  PRN


 


 Radiology Procedure  3/12/20 11:00


     


 


 


 Barium Sulfate


  (Barium EZ HD)  1 ea  NOW  PRN


 


 Radiology Procedure  3/12/20 11:00


     


 


 


 Barium Sulfate


  (Varibar Thin


 Liquid powder)  148 gm  NOW  PRN


 


 Radiology Procedure  3/12/20 17:15


 3/13/20 17:07 UNV  


 


 


 Diphenhydramine


 HCl


  (Benadryl)  50 mg  Q6H  PRN


 ORAL


 Itching  3/12/20 11:00


 4/10/20 10:59   


 


 


 Ketorolac


 Tromethamine


  (Toradol 30mg)  15 mg  Q6H  PRN


 IV


 Severe Pain (Pain Scale 7-10)  3/12/20 12:00


 3/15/20 17:59 UNV  


 


 


 Ondansetron HCl


  (Zofran)  4 mg  Q6H  PRN


 IVP


 Nausea & Vomiting  3/12/20 16:45


 4/9/20 16:44 UNV  


 


 


 Pantoprazole


  (Protonix)  40 mg  DAILY


 IVP


   3/13/20 09:00


 4/10/20 08:59 UNV  


 

















Boris Amador MD Mar 12, 2020 10:54

## 2020-03-13 VITALS — DIASTOLIC BLOOD PRESSURE: 80 MMHG | SYSTOLIC BLOOD PRESSURE: 124 MMHG

## 2020-03-13 VITALS — DIASTOLIC BLOOD PRESSURE: 65 MMHG | SYSTOLIC BLOOD PRESSURE: 113 MMHG

## 2020-03-13 VITALS — SYSTOLIC BLOOD PRESSURE: 102 MMHG | DIASTOLIC BLOOD PRESSURE: 61 MMHG

## 2020-03-13 VITALS — SYSTOLIC BLOOD PRESSURE: 131 MMHG | DIASTOLIC BLOOD PRESSURE: 78 MMHG

## 2020-03-13 VITALS — DIASTOLIC BLOOD PRESSURE: 70 MMHG | SYSTOLIC BLOOD PRESSURE: 105 MMHG

## 2020-03-13 RX ADMIN — PANTOPRAZOLE SODIUM SCH MG: 40 INJECTION, POWDER, FOR SOLUTION INTRAVENOUS at 08:58

## 2020-03-13 NOTE — PULMONOLOGY PROGRESS NOTE
Assessment/Plan


Assessment/Plan


IMPRESSION:


1. Postoperative day #3 status post repair of paraesophageal hernia,


gastropexy and bronchoscopy.


2. Mild leukocytosis.


3. Borderline anemia.


4. Gastroesophageal reflux disease.





DISCUSSION:  


I will follow as internist and pulmonologist.  


Discussed with Dr. Rajendra Arnold.


Advance diet per surgery














  ______________________________________________


  Boris Amador M.D.





Subjective


Interval Events:  Feeling better; seen ambulating


Constitutional:  Reports: no symptoms


HEENT:  Repors: no symptoms


Respiratory:  Reports: no symptoms


Cardiovascular:  Reports: no symptoms


Gastrointestinal/Abdominal:  Reports: no symptoms


Genitourinary:  Reports: no symptoms


Allergies:  


Coded Allergies:  


     IBUPROFEN (Verified  Allergy, Severe, 3/10/20)


 swelling in the mouth





Objective





Last 24 Hour Vital Signs








  Date Time  Temp Pulse Resp B/P (MAP) Pulse Ox O2 Delivery O2 Flow Rate FiO2


 


3/13/20 08:57  104  131/78    


 


3/13/20 08:00 97.8 104 19 131/78 (95) 97   


 


3/13/20 04:00 98.6 80 16 124/80 (95) 96   


 


3/13/20 04:00  96      


 


3/13/20 00:00  95      


 


3/13/20 00:00 98.8 89 16 131/78 (95) 95   


 


3/12/20 20:00 99.3 97 14 120/76 (91) 97   


 


3/12/20 20:00  107      


 


3/12/20 16:00 98.6 88 18 129/80 (96) 97   


 


3/12/20 15:45  96      


 


3/12/20 12:00 98.2 98 20 130/75 (93) 98   


 


3/12/20 11:52  92      


 


3/12/20 10:00  93 25 128/70 (89) 94   


 


3/12/20 09:52  87      

















Intake and Output  


 


 3/12/20 3/13/20





 19:00 07:00


 


Intake Total 150 ml 


 


Output Total 1200 ml 


 


Balance -1050 ml 


 


  


 


Intake Oral 140 ml 


 


Other 10 ml 


 


Output Urine Total 1200 ml 


 


# Voids 4 


 


# Bowel Movements 1 








General Appearance:  no acute distress


HEENT:  normocephalic


Respiratory/Chest:  chest wall non-tender


Cardiovascular:  normal peripheral pulses


Abdomen:  normal bowel sounds





Microbiology








 Date/Time


Source Procedure


Growth Status


 


 


 3/10/20 10:03


Nasal Nares MRSA Culture - Final


NO METHICILLIN RESISTANT STAPH AUREUS... Complete











Current Medications








 Medications


  (Trade)  Dose


 Ordered  Sig/Kassy


 Route


 PRN Reason  Start Time


 Stop Time Status Last Admin


Dose Admin


 


 Acetaminophen/


 Hydrocodone Bitart


  (Norco 10/325)  1 tab  Q4H  PRN


 ORAL


 Moderate Pain (Pain Scale 4-6)  3/12/20 11:00


 3/18/20 10:59   


 


 


 Acetaminophen/


 Hydrocodone Bitart


  (Norco 5/325)  1 tab  Q4H  PRN


 ORAL


 Mild Pain (Pain Scale 1-3)  3/12/20 11:00


 3/18/20 10:59   


 


 


 Barium Sulfate


  (Barium EZ Gas


 II)  1 ea  NOW  PRN


 


 Radiology Procedure  3/12/20 11:00


     


 


 


 Barium Sulfate


  (Barium EZ HD)  1 ea  NOW  PRN


 


 Radiology Procedure  3/12/20 11:00


     


 


 


 Barium Sulfate


  (Varibar Thin


 Liquid powder)  148 gm  NOW  PRN


 


 Radiology Procedure  3/12/20 11:00


     


 


 


 Diphenhydramine


 HCl


  (Benadryl)  50 mg  Q6H  PRN


 ORAL


 Itching  3/12/20 11:00


 4/10/20 10:59   


 


 


 Ketorolac


 Tromethamine


  (Toradol 30mg)  15 mg  Q6H  PRN


 IV


 Severe Pain (Pain Scale 7-10)  3/12/20 11:00


 3/15/20 10:59   


 


 


 Metoprolol


 Tartrate


  (Lopressor)  25 mg  DAILY


 ORAL


   3/13/20 09:00


 4/12/20 08:59  3/13/20 08:57


 


 


 Ondansetron HCl


  (Zofran)  4 mg  Q6H  PRN


 IVP


 Nausea & Vomiting  3/12/20 11:00


 4/9/20 10:59   


 


 


 Pantoprazole


  (Protonix)  40 mg  DAILY


 IVP


   3/13/20 09:00


 4/10/20 08:59  3/13/20 08:58


 

















Boris Amador MD Mar 13, 2020 09:26

## 2020-03-13 NOTE — GENERAL PROGRESS NOTE
Assessment/Plan


Assessment/Plan:


Advance to mechanical soft diet


Nutritional consult


Physicial therapy


Discharge planning in AM





Subjective


Date patient seen:  Mar 13, 2020


Allergies:  


Coded Allergies:  


     IBUPROFEN (Verified  Allergy, Severe, 3/10/20)


 swelling in the mouth


Subjective


No complaints.  Tolerating full liquids





Objective





Last 24 Hour Vital Signs








  Date Time  Temp Pulse Resp B/P (MAP) Pulse Ox O2 Delivery O2 Flow Rate FiO2


 


3/13/20 08:57  104  131/78    


 


3/13/20 08:00 97.8 104 19 131/78 (95) 97   


 


3/13/20 04:00 98.6 80 16 124/80 (95) 96   


 


3/13/20 04:00  96      


 


3/13/20 00:00  95      


 


3/13/20 00:00 98.8 89 16 131/78 (95) 95   


 


3/12/20 20:00 99.3 97 14 120/76 (91) 97   


 


3/12/20 20:00  107      


 


3/12/20 16:00 98.6 88 18 129/80 (96) 97   


 


3/12/20 15:45  96      


 


3/12/20 12:00 98.2 98 20 130/75 (93) 98   


 


3/12/20 11:52  92      


 


3/12/20 10:00  93 25 128/70 (89) 94   


 


3/12/20 09:52  87      

















Intake and Output  


 


 3/12/20 3/13/20





 19:00 07:00


 


Intake Total 150 ml 


 


Output Total 1200 ml 


 


Balance -1050 ml 


 


  


 


Intake Oral 140 ml 


 


Other 10 ml 


 


Output Urine Total 1200 ml 


 


# Voids 4 


 


# Bowel Movements 1 








Height (Feet):  5


Height (Inches):  4.00


Weight (Pounds):  177











Rajendra Arnold MD Mar 13, 2020 09:21

## 2020-03-14 VITALS — SYSTOLIC BLOOD PRESSURE: 117 MMHG | DIASTOLIC BLOOD PRESSURE: 73 MMHG

## 2020-03-14 VITALS — SYSTOLIC BLOOD PRESSURE: 115 MMHG | DIASTOLIC BLOOD PRESSURE: 67 MMHG

## 2020-03-14 VITALS — SYSTOLIC BLOOD PRESSURE: 120 MMHG | DIASTOLIC BLOOD PRESSURE: 79 MMHG

## 2020-03-14 VITALS — SYSTOLIC BLOOD PRESSURE: 116 MMHG | DIASTOLIC BLOOD PRESSURE: 72 MMHG

## 2020-03-14 RX ADMIN — PANTOPRAZOLE SODIUM SCH MG: 40 INJECTION, POWDER, FOR SOLUTION INTRAVENOUS at 08:35

## 2020-03-14 NOTE — DISCHARGE SUMMARY
DATE OF ADMISSION:  03/10/2020



DATE OF DISCHARGE:  03/14/2020



PREOPERATIVE DIAGNOSIS:  Paraesophageal hernia.



POSTOPERATIVE DIAGNOSIS:  Paraesophageal hernia.



HOSPITAL COURSE:  The patient is a 65-year-old  female, who

presented to [facility] for elective repair of paraesophageal

hernia performed on March 11, 2020.  Postoperatively, the patient did well

and was extubated. The esophagram on postop day 1 demonstrated no

evidence of contrast extravasation and she was started on a clear liquid

diet.  Her diet was then slowly advanced to mechanical soft.  She

ambulated well and the pain was then well controlled.  The patient was

discharged today, on 03/14/2020.



DISCHARGE DIET:  Mechanical soft diet.



DISCHARGE PAIN MEDICATIONS:  Norco 5 mg one p.o. every 4 hours p.r.n. for

pain.



AMBULATION:  As tolerated.



FOLLOWUP:  The patient is to follow up with Dr. Rajendra Arnold, two weeks in his

clinic.









  ______________________________________________

  Judd M.D.





DR:  DEMOND

D:  03/14/2020 12:10

T:  03/15/2020 02:21

JOB#:  [id number]

CC:



[id number]